# Patient Record
Sex: FEMALE | Race: WHITE | Employment: OTHER | URBAN - METROPOLITAN AREA
[De-identification: names, ages, dates, MRNs, and addresses within clinical notes are randomized per-mention and may not be internally consistent; named-entity substitution may affect disease eponyms.]

---

## 2022-11-29 ENCOUNTER — APPOINTMENT (OUTPATIENT)
Dept: GENERAL RADIOLOGY | Age: 87
End: 2022-11-29
Payer: MEDICARE

## 2022-11-29 ENCOUNTER — HOSPITAL ENCOUNTER (INPATIENT)
Age: 87
LOS: 2 days | Discharge: HOME | End: 2022-12-02
Attending: INTERNAL MEDICINE | Admitting: INTERNAL MEDICINE
Payer: MEDICARE

## 2022-11-29 DIAGNOSIS — J96.21 ACUTE ON CHRONIC RESPIRATORY FAILURE WITH HYPOXIA (HCC): ICD-10-CM

## 2022-11-29 DIAGNOSIS — J10.1 INFLUENZA A: Primary | ICD-10-CM

## 2022-11-29 LAB
BASOPHILS ABSOLUTE: 0 K/UL (ref 0–0.2)
BASOPHILS RELATIVE PERCENT: 0.4 %
EOSINOPHILS ABSOLUTE: 0.3 K/UL (ref 0–0.6)
EOSINOPHILS RELATIVE PERCENT: 3.2 %
HCT VFR BLD CALC: 38.2 % (ref 36–48)
HEMOGLOBIN: 12.6 G/DL (ref 12–16)
LYMPHOCYTES ABSOLUTE: 1.9 K/UL (ref 1–5.1)
LYMPHOCYTES RELATIVE PERCENT: 23.3 %
MCH RBC QN AUTO: 30 PG (ref 26–34)
MCHC RBC AUTO-ENTMCNC: 33.1 G/DL (ref 31–36)
MCV RBC AUTO: 90.6 FL (ref 80–100)
MONOCYTES ABSOLUTE: 0.7 K/UL (ref 0–1.3)
MONOCYTES RELATIVE PERCENT: 8.5 %
NEUTROPHILS ABSOLUTE: 5.2 K/UL (ref 1.7–7.7)
NEUTROPHILS RELATIVE PERCENT: 64.6 %
PDW BLD-RTO: 13.6 % (ref 12.4–15.4)
PLATELET # BLD: 274 K/UL (ref 135–450)
PMV BLD AUTO: 7.7 FL (ref 5–10.5)
RBC # BLD: 4.21 M/UL (ref 4–5.2)
WBC # BLD: 8.1 K/UL (ref 4–11)

## 2022-11-29 PROCEDURE — 83605 ASSAY OF LACTIC ACID: CPT

## 2022-11-29 PROCEDURE — 94640 AIRWAY INHALATION TREATMENT: CPT

## 2022-11-29 PROCEDURE — 85025 COMPLETE CBC W/AUTO DIFF WBC: CPT

## 2022-11-29 PROCEDURE — 6370000000 HC RX 637 (ALT 250 FOR IP): Performed by: NURSE PRACTITIONER

## 2022-11-29 PROCEDURE — 80053 COMPREHEN METABOLIC PANEL: CPT

## 2022-11-29 PROCEDURE — 6360000002 HC RX W HCPCS: Performed by: NURSE PRACTITIONER

## 2022-11-29 PROCEDURE — 71045 X-RAY EXAM CHEST 1 VIEW: CPT

## 2022-11-29 PROCEDURE — 87040 BLOOD CULTURE FOR BACTERIA: CPT

## 2022-11-29 PROCEDURE — 2700000000 HC OXYGEN THERAPY PER DAY

## 2022-11-29 PROCEDURE — 96374 THER/PROPH/DIAG INJ IV PUSH: CPT

## 2022-11-29 PROCEDURE — 83880 ASSAY OF NATRIURETIC PEPTIDE: CPT

## 2022-11-29 PROCEDURE — 51701 INSERT BLADDER CATHETER: CPT

## 2022-11-29 PROCEDURE — 84145 PROCALCITONIN (PCT): CPT

## 2022-11-29 PROCEDURE — 87635 SARS-COV-2 COVID-19 AMP PRB: CPT

## 2022-11-29 PROCEDURE — 99285 EMERGENCY DEPT VISIT HI MDM: CPT

## 2022-11-29 PROCEDURE — 36415 COLL VENOUS BLD VENIPUNCTURE: CPT

## 2022-11-29 PROCEDURE — 84484 ASSAY OF TROPONIN QUANT: CPT

## 2022-11-29 RX ORDER — IPRATROPIUM BROMIDE AND ALBUTEROL SULFATE 2.5; .5 MG/3ML; MG/3ML
1 SOLUTION RESPIRATORY (INHALATION) ONCE
Status: COMPLETED | OUTPATIENT
Start: 2022-11-29 | End: 2022-11-29

## 2022-11-29 RX ORDER — ALBUTEROL SULFATE 2.5 MG/3ML
5 SOLUTION RESPIRATORY (INHALATION) ONCE
Status: COMPLETED | OUTPATIENT
Start: 2022-11-29 | End: 2022-11-29

## 2022-11-29 RX ORDER — METHYLPREDNISOLONE SODIUM SUCCINATE 125 MG/2ML
125 INJECTION, POWDER, LYOPHILIZED, FOR SOLUTION INTRAMUSCULAR; INTRAVENOUS ONCE
Status: COMPLETED | OUTPATIENT
Start: 2022-11-29 | End: 2022-11-29

## 2022-11-29 RX ADMIN — IPRATROPIUM BROMIDE AND ALBUTEROL SULFATE 1 AMPULE: .5; 3 SOLUTION RESPIRATORY (INHALATION) at 23:28

## 2022-11-29 RX ADMIN — ALBUTEROL SULFATE 5 MG: 2.5 SOLUTION RESPIRATORY (INHALATION) at 23:28

## 2022-11-29 RX ADMIN — METHYLPREDNISOLONE SODIUM SUCCINATE 125 MG: 125 INJECTION, POWDER, FOR SOLUTION INTRAMUSCULAR; INTRAVENOUS at 23:42

## 2022-11-29 ASSESSMENT — ENCOUNTER SYMPTOMS
ABDOMINAL PAIN: 0
DIARRHEA: 0
VOMITING: 0
CHEST TIGHTNESS: 1
NAUSEA: 0
SHORTNESS OF BREATH: 1
COUGH: 1

## 2022-11-29 ASSESSMENT — PAIN - FUNCTIONAL ASSESSMENT: PAIN_FUNCTIONAL_ASSESSMENT: NONE - DENIES PAIN

## 2022-11-29 ASSESSMENT — LIFESTYLE VARIABLES
HOW OFTEN DO YOU HAVE A DRINK CONTAINING ALCOHOL: NEVER
HOW MANY STANDARD DRINKS CONTAINING ALCOHOL DO YOU HAVE ON A TYPICAL DAY: PATIENT DOES NOT DRINK

## 2022-11-30 PROBLEM — J96.01 ACUTE RESPIRATORY FAILURE WITH HYPOXIA (HCC): Status: ACTIVE | Noted: 2022-11-30

## 2022-11-30 LAB
A/G RATIO: 1.1 (ref 1.1–2.2)
ALBUMIN SERPL-MCNC: 3.8 G/DL (ref 3.4–5)
ALP BLD-CCNC: 120 U/L (ref 40–129)
ALT SERPL-CCNC: 16 U/L (ref 10–40)
ANION GAP SERPL CALCULATED.3IONS-SCNC: 2 MMOL/L (ref 3–16)
AST SERPL-CCNC: 16 U/L (ref 15–37)
BASE EXCESS VENOUS: 10.8 MMOL/L (ref -3–3)
BILIRUB SERPL-MCNC: 0.3 MG/DL (ref 0–1)
BUN BLDV-MCNC: 14 MG/DL (ref 7–20)
CALCIUM SERPL-MCNC: 9.8 MG/DL (ref 8.3–10.6)
CARBOXYHEMOGLOBIN: 2.7 % (ref 0–1.5)
CHLORIDE BLD-SCNC: 92 MMOL/L (ref 99–110)
CO2: 41 MMOL/L (ref 21–32)
CREAT SERPL-MCNC: 0.6 MG/DL (ref 0.6–1.2)
EKG ATRIAL RATE: 78 BPM
EKG DIAGNOSIS: NORMAL
EKG P AXIS: 95 DEGREES
EKG P-R INTERVAL: 160 MS
EKG Q-T INTERVAL: 440 MS
EKG QRS DURATION: 148 MS
EKG QTC CALCULATION (BAZETT): 501 MS
EKG R AXIS: -50 DEGREES
EKG T AXIS: 80 DEGREES
EKG VENTRICULAR RATE: 78 BPM
GFR SERPL CREATININE-BSD FRML MDRD: >60 ML/MIN/{1.73_M2}
GLUCOSE BLD-MCNC: 156 MG/DL (ref 70–99)
HCO3 VENOUS: 40 MMOL/L (ref 23–29)
LACTIC ACID, SEPSIS: 1.4 MMOL/L (ref 0.4–1.9)
MAGNESIUM: 1.6 MG/DL (ref 1.8–2.4)
METHEMOGLOBIN VENOUS: 0.4 %
O2 CONTENT, VEN: 18 VOL %
O2 SAT, VEN: 99 %
O2 THERAPY: ABNORMAL
PCO2, VEN: 76.5 MMHG (ref 40–50)
PH VENOUS: 7.33 (ref 7.35–7.45)
PO2, VEN: 131 MMHG (ref 25–40)
POTASSIUM SERPL-SCNC: 4.6 MMOL/L (ref 3.5–5.1)
PRO-BNP: 341 PG/ML (ref 0–449)
PROCALCITONIN: 0.04 NG/ML (ref 0–0.15)
RAPID INFLUENZA  B AGN: NEGATIVE
RAPID INFLUENZA A AGN: NEGATIVE
SARS-COV-2, NAAT: NOT DETECTED
SODIUM BLD-SCNC: 135 MMOL/L (ref 136–145)
TCO2 CALC VENOUS: 95 MMOL/L
TOTAL PROTEIN: 7.2 G/DL (ref 6.4–8.2)
TROPONIN: <0.01 NG/ML

## 2022-11-30 PROCEDURE — 2700000000 HC OXYGEN THERAPY PER DAY

## 2022-11-30 PROCEDURE — 82803 BLOOD GASES ANY COMBINATION: CPT

## 2022-11-30 PROCEDURE — 2580000003 HC RX 258: Performed by: INTERNAL MEDICINE

## 2022-11-30 PROCEDURE — 6370000000 HC RX 637 (ALT 250 FOR IP): Performed by: INTERNAL MEDICINE

## 2022-11-30 PROCEDURE — 94640 AIRWAY INHALATION TREATMENT: CPT

## 2022-11-30 PROCEDURE — 1200000000 HC SEMI PRIVATE

## 2022-11-30 PROCEDURE — 2500000003 HC RX 250 WO HCPCS: Performed by: INTERNAL MEDICINE

## 2022-11-30 PROCEDURE — 6360000002 HC RX W HCPCS: Performed by: INTERNAL MEDICINE

## 2022-11-30 PROCEDURE — 87804 INFLUENZA ASSAY W/OPTIC: CPT

## 2022-11-30 PROCEDURE — 2060000000 HC ICU INTERMEDIATE R&B

## 2022-11-30 PROCEDURE — 93005 ELECTROCARDIOGRAM TRACING: CPT | Performed by: INTERNAL MEDICINE

## 2022-11-30 PROCEDURE — 94150 VITAL CAPACITY TEST: CPT

## 2022-11-30 PROCEDURE — 83735 ASSAY OF MAGNESIUM: CPT

## 2022-11-30 PROCEDURE — 93010 ELECTROCARDIOGRAM REPORT: CPT | Performed by: INTERNAL MEDICINE

## 2022-11-30 PROCEDURE — 94761 N-INVAS EAR/PLS OXIMETRY MLT: CPT

## 2022-11-30 PROCEDURE — 36415 COLL VENOUS BLD VENIPUNCTURE: CPT

## 2022-11-30 RX ORDER — IPRATROPIUM BROMIDE AND ALBUTEROL SULFATE 2.5; .5 MG/3ML; MG/3ML
1 SOLUTION RESPIRATORY (INHALATION) 2 TIMES DAILY
Status: DISCONTINUED | OUTPATIENT
Start: 2022-11-30 | End: 2022-12-02 | Stop reason: HOSPADM

## 2022-11-30 RX ORDER — ASPIRIN 81 MG/1
81 TABLET ORAL DAILY
COMMUNITY

## 2022-11-30 RX ORDER — ONDANSETRON 2 MG/ML
4 INJECTION INTRAMUSCULAR; INTRAVENOUS EVERY 6 HOURS PRN
Status: DISCONTINUED | OUTPATIENT
Start: 2022-11-30 | End: 2022-12-02 | Stop reason: HOSPADM

## 2022-11-30 RX ORDER — MAGNESIUM SULFATE IN WATER 40 MG/ML
2000 INJECTION, SOLUTION INTRAVENOUS PRN
Status: DISCONTINUED | OUTPATIENT
Start: 2022-11-30 | End: 2022-12-02 | Stop reason: HOSPADM

## 2022-11-30 RX ORDER — FUROSEMIDE 10 MG/ML
40 INJECTION INTRAMUSCULAR; INTRAVENOUS ONCE
Status: COMPLETED | OUTPATIENT
Start: 2022-11-30 | End: 2022-11-30

## 2022-11-30 RX ORDER — ENOXAPARIN SODIUM 100 MG/ML
40 INJECTION SUBCUTANEOUS DAILY
Status: DISCONTINUED | OUTPATIENT
Start: 2022-11-30 | End: 2022-12-02 | Stop reason: HOSPADM

## 2022-11-30 RX ORDER — SODIUM CHLORIDE 0.9 % (FLUSH) 0.9 %
10 SYRINGE (ML) INJECTION EVERY 12 HOURS SCHEDULED
Status: DISCONTINUED | OUTPATIENT
Start: 2022-11-30 | End: 2022-12-02 | Stop reason: HOSPADM

## 2022-11-30 RX ORDER — LORAZEPAM 0.5 MG/1
0.5 TABLET ORAL ONCE
Status: COMPLETED | OUTPATIENT
Start: 2022-11-30 | End: 2022-11-30

## 2022-11-30 RX ORDER — ALBUTEROL SULFATE 2.5 MG/3ML
2.5 SOLUTION RESPIRATORY (INHALATION) EVERY 4 HOURS PRN
Status: DISCONTINUED | OUTPATIENT
Start: 2022-11-30 | End: 2022-12-02 | Stop reason: HOSPADM

## 2022-11-30 RX ORDER — BENAZEPRIL HYDROCHLORIDE 20 MG/1
20 TABLET ORAL 2 TIMES DAILY
COMMUNITY

## 2022-11-30 RX ORDER — AMLODIPINE BESYLATE 10 MG/1
10 TABLET ORAL DAILY
COMMUNITY

## 2022-11-30 RX ORDER — LOVASTATIN 20 MG/1
20 TABLET ORAL NIGHTLY
COMMUNITY

## 2022-11-30 RX ORDER — PROMETHAZINE HYDROCHLORIDE 25 MG/1
12.5 TABLET ORAL EVERY 6 HOURS PRN
Status: DISCONTINUED | OUTPATIENT
Start: 2022-11-30 | End: 2022-12-02 | Stop reason: HOSPADM

## 2022-11-30 RX ORDER — VITAMIN E 268 MG
400 CAPSULE ORAL DAILY
COMMUNITY

## 2022-11-30 RX ORDER — CHLORAL HYDRATE 500 MG
1000 CAPSULE ORAL DAILY
COMMUNITY

## 2022-11-30 RX ORDER — IPRATROPIUM BROMIDE AND ALBUTEROL SULFATE 2.5; .5 MG/3ML; MG/3ML
1 SOLUTION RESPIRATORY (INHALATION) 4 TIMES DAILY
Status: DISCONTINUED | OUTPATIENT
Start: 2022-11-30 | End: 2022-11-30

## 2022-11-30 RX ORDER — SODIUM CHLORIDE 0.9 % (FLUSH) 0.9 %
10 SYRINGE (ML) INJECTION PRN
Status: DISCONTINUED | OUTPATIENT
Start: 2022-11-30 | End: 2022-12-02 | Stop reason: HOSPADM

## 2022-11-30 RX ORDER — ALBUTEROL SULFATE 90 UG/1
2 AEROSOL, METERED RESPIRATORY (INHALATION) EVERY 6 HOURS PRN
COMMUNITY

## 2022-11-30 RX ORDER — PREDNISONE 20 MG/1
40 TABLET ORAL DAILY
Status: DISCONTINUED | OUTPATIENT
Start: 2022-11-30 | End: 2022-12-02 | Stop reason: HOSPADM

## 2022-11-30 RX ORDER — ACETAMINOPHEN 325 MG/1
650 TABLET ORAL EVERY 6 HOURS PRN
Status: DISCONTINUED | OUTPATIENT
Start: 2022-11-30 | End: 2022-12-02 | Stop reason: HOSPADM

## 2022-11-30 RX ORDER — SODIUM CHLORIDE 9 MG/ML
INJECTION, SOLUTION INTRAVENOUS PRN
Status: DISCONTINUED | OUTPATIENT
Start: 2022-11-30 | End: 2022-12-02 | Stop reason: HOSPADM

## 2022-11-30 RX ORDER — ACETAMINOPHEN 650 MG/1
650 SUPPOSITORY RECTAL EVERY 6 HOURS PRN
Status: DISCONTINUED | OUTPATIENT
Start: 2022-11-30 | End: 2022-12-02 | Stop reason: HOSPADM

## 2022-11-30 RX ORDER — POTASSIUM CHLORIDE 7.45 MG/ML
10 INJECTION INTRAVENOUS PRN
Status: DISCONTINUED | OUTPATIENT
Start: 2022-11-30 | End: 2022-12-02 | Stop reason: HOSPADM

## 2022-11-30 RX ORDER — FUROSEMIDE 10 MG/ML
40 INJECTION INTRAMUSCULAR; INTRAVENOUS 2 TIMES DAILY
Status: DISCONTINUED | OUTPATIENT
Start: 2022-11-30 | End: 2022-12-02 | Stop reason: HOSPADM

## 2022-11-30 RX ORDER — METOPROLOL SUCCINATE 100 MG/1
100 TABLET, EXTENDED RELEASE ORAL DAILY
COMMUNITY

## 2022-11-30 RX ORDER — ISOSORBIDE MONONITRATE 120 MG/1
120 TABLET, EXTENDED RELEASE ORAL DAILY
COMMUNITY

## 2022-11-30 RX ADMIN — FUROSEMIDE 40 MG: 10 INJECTION, SOLUTION INTRAMUSCULAR; INTRAVENOUS at 20:02

## 2022-11-30 RX ADMIN — DOXYCYCLINE 100 MG: 100 INJECTION, POWDER, LYOPHILIZED, FOR SOLUTION INTRAVENOUS at 16:40

## 2022-11-30 RX ADMIN — LORAZEPAM 0.5 MG: 0.5 TABLET ORAL at 15:43

## 2022-11-30 RX ADMIN — IPRATROPIUM BROMIDE AND ALBUTEROL SULFATE 1 AMPULE: .5; 3 SOLUTION RESPIRATORY (INHALATION) at 07:20

## 2022-11-30 RX ADMIN — ENOXAPARIN SODIUM 40 MG: 100 INJECTION SUBCUTANEOUS at 09:25

## 2022-11-30 RX ADMIN — FUROSEMIDE 40 MG: 10 INJECTION, SOLUTION INTRAMUSCULAR; INTRAVENOUS at 03:53

## 2022-11-30 RX ADMIN — MAGNESIUM SULFATE HEPTAHYDRATE 2000 MG: 40 INJECTION, SOLUTION INTRAVENOUS at 13:23

## 2022-11-30 RX ADMIN — PREDNISONE 40 MG: 20 TABLET ORAL at 09:25

## 2022-11-30 RX ADMIN — Medication 10 ML: at 20:02

## 2022-11-30 RX ADMIN — IPRATROPIUM BROMIDE AND ALBUTEROL SULFATE 1 AMPULE: .5; 3 SOLUTION RESPIRATORY (INHALATION) at 20:46

## 2022-11-30 RX ADMIN — DOXYCYCLINE 100 MG: 100 INJECTION, POWDER, LYOPHILIZED, FOR SOLUTION INTRAVENOUS at 05:03

## 2022-11-30 RX ADMIN — Medication 10 ML: at 09:26

## 2022-11-30 ASSESSMENT — PAIN SCALES - GENERAL
PAINLEVEL_OUTOF10: 0
PAINLEVEL_OUTOF10: 0

## 2022-11-30 NOTE — ED PROVIDER NOTES
EKG Interpretation    Interpreted by emergency department physician    Rhythm: normal sinus   Rate: normal  Axis: left  Ectopy: none  Conduction: lbbb  ST Segments: no acute change  T Waves: no acute change  Q Waves: none    Clinical Impression: Normal sinus rhythm    MD Gianna Landers MD  11/30/22 5622

## 2022-11-30 NOTE — ED PROVIDER NOTES
[facility]        Pt Name: Arleen Heart  MRN: [id number]  [place] 1934  Date of evaluation: 11/29/2022  Provider: CHANNING Sanchez CNP  PCP: No primary care provider on file. Note Started: 11:09 PM EST 11/29/22          REJI. I have evaluated this patient. My supervising physician was available for consultation. CHIEF COMPLAINT       Chief Complaint   Patient presents with    Shortness of Breath     Pt arrived via [address] [place] [address] EMS from home w c/o SOB. Pt was Dx pneumonia on Sunday in urgent care but \"felt like cant handle it\". 3 lpm O2 via [place] baseline. Got Duoneb en route       HISTORY OF PRESENT ILLNESS   (Location, Timing/Onset, Context/Setting, Quality, Duration, Modifying Factors, Severity, Associated Signs and Symptoms)  Note limiting factors. Chief Complaint: rosanna Heart is a 80 y.o. female who presents to the emergency department with complaint of shortness of breath. The patient was diagnosed with influenza last week and was seen at urgent care Sunday, told that she had pneumonia and given an antibiotic. States that she is taken 3 days of the antibiotic but the shortness of breath has worsened. The patient does wear 3 L of oxygen at all times but it can increase her oxygen to 4 L with activity. Patient was given 1 DuoNeb in route. She complains of shortness of breath with wheezing    Denies any headache, fever, lightheadedness, dizziness, visual disturbances. No chest pain or pressure. No neck or back pain. No abdominal pain, nausea, vomiting, diarrhea, constipation, or dysuria. No rash. Nursing Notes were all reviewed and agreed with or any disagreements were addressed in the HPI. REVIEW OF SYSTEMS    (2-9 systems for level 4, 10 or more for level 5)     Review of Systems   Constitutional:  Positive for fatigue. Negative for activity change, chills and fever.    Respiratory:  Positive for cough, chest tightness and shortness of breath. Cardiovascular:  Negative for chest pain. Gastrointestinal:  Negative for abdominal pain, diarrhea, nausea and vomiting. Genitourinary:  Negative for dysuria. All other systems reviewed and are negative. Positives and Pertinent negatives as per HPI. Except as noted above in the ROS, all other systems were reviewed and negative. PAST MEDICAL HISTORY   History reviewed. No pertinent past medical history. SURGICAL HISTORY   History reviewed. No pertinent surgical history. CURRENTMEDICATIONS       Previous Medications    No medications on file         ALLERGIES     Patient has no known allergies. FAMILYHISTORY     History reviewed. No pertinent family history. SOCIAL HISTORY       Social History     Tobacco Use    Smoking status: Never    Smokeless tobacco: Never   Substance Use Topics    Alcohol use: Never    Drug use: Never       SCREENINGS    Monroe Coma Scale  Eye Opening: Spontaneous  Best Verbal Response: Oriented  Best Motor Response: Obeys commands  Denny Coma Scale Score: 15        PHYSICAL EXAM    (up to 7 for level 4, 8 or more for level 5)     ED Triage Vitals [11/29/22 2217]   BP Temp Temp Source Heart Rate Resp SpO2 Height Weight   (!) 160/79 97.9 °F (36.6 °C) Oral 70 24 92 % -- --       Physical Exam  Vitals and nursing note reviewed. Constitutional:       Appearance: She is well-developed. She is not diaphoretic. HENT:      Head: Normocephalic and atraumatic. Right Ear: External ear normal.      Left Ear: External ear normal.   Eyes:      General:         Right eye: No discharge. Left eye: No discharge. Neck:      Vascular: No JVD. Cardiovascular:      Rate and Rhythm: Normal rate. Pulses: Normal pulses. Pulmonary:      Effort: Pulmonary effort is normal. No respiratory distress. Breath sounds: Wheezing present.       Comments: Increased work of breathing at rest  Abdominal:      Palpations: Abdomen is soft. Tenderness: There is no abdominal tenderness. Musculoskeletal:         General: Normal range of motion. Skin:     General: Skin is warm and dry. Coloration: Skin is not pale. Neurological:      Mental Status: She is alert. Psychiatric:         Behavior: Behavior normal.       DIAGNOSTIC RESULTS   LABS:    Labs Reviewed   COMPREHENSIVE METABOLIC PANEL - Abnormal; Notable for the following components:       Result Value    Sodium 135 (*)     Chloride 92 (*)     CO2 41 (*)     Anion Gap 2 (*)     Glucose 156 (*)     All other components within normal limits   COVID-19, RAPID   CULTURE, BLOOD 1   CULTURE, BLOOD 2   CBC WITH AUTO DIFFERENTIAL   PROCALCITONIN   LACTATE, SEPSIS   LACTATE, SEPSIS       When ordered only abnormal lab results are displayed. All other labs were within normal range or not returned as of this dictation. EKG: When ordered, EKG's are interpreted by the Emergency Department Physician in the absence of a cardiologist.  Please see their note for interpretation of EKG. RADIOLOGY:   Non-plain film images such as CT, Ultrasound and MRI are read by the radiologist. Plain radiographic images are visualized and preliminarily interpreted by the ED Provider with the below findings:        Interpretation per the Radiologist below, if available at the time of this note:    XR CHEST PORTABLE   Final Result   Mild cardiomegaly and mild central pulmonary congestion or pulmonary artery   hypertension. Hazy perihilar bibasilar interstitial edema vs pneumonitis or residual   fibrosis and scarring           No results found. PROCEDURES   Unless otherwise noted below, none     Procedures    CRITICAL CARE TIME   There was a high probability of life-threatening clinical deterioration in the patient's condition requiring my urgent intervention.   I personally saw the patient and independently provided 31 minutes of non-concurrent critical care out of the total shared critical vs pneumonitis or residual   fibrosis and scarring               Labs are unremarkable. Negative lactate and unremarkable procalcitonin. Patient diagnosed with influenza 1 week ago and did complete a 5-day course of Tamiflu. She did revisit urgent care Sunday and was placed on an antibiotic, she is uncertain of the name of this antibiotic, states that she does have several days left, has only taken 5 doses. Reports that she continues to decline and is short of breath at rest.    Will admit this patient for acute on chronic respiratory failure. Patient placed on high flow oxygen in the emergency department. Patient agreeable regarding plan of care. Hospitalist to admit this patient to    FINAL IMPRESSION      1. Influenza A    2. Acute on chronic respiratory failure with hypoxia ([facility] [address]          DISPOSITION/PLAN   DISPOSITION Decision To Admit 11/30/2022 02:12:25 AM      PATIENT REFERRED TO:  No follow-up provider specified.     DISCHARGE MEDICATIONS:  New Prescriptions    No medications on file       DISCONTINUED MEDICATIONS:  Discontinued Medications    No medications on file              (Please note that portions of this note were completed with a voice recognition program.  Efforts were made to edit the dictations but occasionally words are mis-transcribed.)    CHANNING Guido CNP (electronically signed)            CHANNING Guido CNP  11/30/22 7855

## 2022-11-30 NOTE — RT PROTOCOL NOTE
RT Inhaler-Nebulizer Bronchodilator Protocol Note    There is a bronchodilator order in the chart from a provider indicating to follow the RT Bronchodilator Protocol and there is an Initiate RT Inhaler-Nebulizer Bronchodilator Protocol order as well (see protocol at bottom of note). CXR Findings:  XR CHEST PORTABLE    Result Date: 11/30/2022  Mild cardiomegaly and mild central pulmonary congestion or pulmonary artery hypertension. Hazy perihilar bibasilar interstitial edema vs pneumonitis or residual fibrosis and scarring       The findings from the last RT Protocol Assessment were as follows:   History Pulmonary Disease: None or smoker <15 pack years  Respiratory Pattern: Dyspnea on exertion or RR 21-25 bpm  Breath Sounds: Slightly diminished and/or crackles  Cough: Strong, spontaneous, non-productive  Indication for Bronchodilator Therapy: Wheezing associated with pulm disorder, On home bronchodilators  Bronchodilator Assessment Score: 4    Aerosolized bronchodilator medication orders have been revised according to the RT Inhaler-Nebulizer Bronchodilator Protocol below. Respiratory Therapist to perform RT Therapy Protocol Assessment initially then follow the protocol. Repeat RT Therapy Protocol Assessment PRN for score 0-3 or on second treatment, BID, and PRN for scores above 3. No Indications - adjust the frequency to every 6 hours PRN wheezing or bronchospasm, if no treatments needed after 48 hours then discontinue using Per Protocol order mode. If indication present, adjust the RT bronchodilator orders based on the Bronchodilator Assessment Score as indicated below.   Use Inhaler orders unless patient has one or more of the following: on home nebulizer, not able to hold breath for 10 seconds, is not alert and oriented, cannot activate and use MDI correctly, or respiratory rate 25 breaths per minute or more, then use the equivalent nebulizer order(s) with same Frequency and PRN reasons based on the score. If a patient is on this medication at home then do not decrease Frequency below that used at home. 0-3 - enter or revise RT bronchodilator order(s) to equivalent RT Bronchodilator order with Frequency of every 4 hours PRN for wheezing or increased work of breathing using Per Protocol order mode. 4-6 - enter or revise RT Bronchodilator order(s) to two equivalent RT bronchodilator orders with one order with BID Frequency and one order with Frequency of every 4 hours PRN wheezing or increased work of breathing using Per Protocol order mode. 7-10 - enter or revise RT Bronchodilator order(s) to two equivalent RT bronchodilator orders with one order with TID Frequency and one order with Frequency of every 4 hours PRN wheezing or increased work of breathing using Per Protocol order mode. 11-13 - enter or revise RT Bronchodilator order(s) to one equivalent RT bronchodilator order with QID Frequency and an Albuterol order with Frequency of every 4 hours PRN wheezing or increased work of breathing using Per Protocol order mode. Greater than 13 - enter or revise RT Bronchodilator order(s) to one equivalent RT bronchodilator order with every 4 hours Frequency and an Albuterol order with Frequency of every 2 hours PRN wheezing or increased work of breathing using Per Protocol order mode. RT to enter RT Home Evaluation for COPD & MDI Assessment order using Per Protocol order mode.     Electronically signed by Clifford Wiggins RCP on 11/30/2022 at 4:38 AM

## 2022-11-30 NOTE — PROGRESS NOTES
11/30/22 0437   RT Protocol   History Pulmonary Disease 0   Respiratory pattern 2   Breath sounds 2   Cough 0   Indications for Bronchodilator Therapy Wheezing associated with pulm disorder; On home bronchodilators   Bronchodilator Assessment Score 4

## 2022-11-30 NOTE — PROGRESS NOTES
Incentive Spirometry education and demonstration completed by Respiratory Therapy Yes      Response to education: Poor     Teaching Time: 10 minutes    Minimum Predicted Vital Capacity - 500 mL. Patient's Actual Vital Capacity - 0 mL.  Turning over to Nursing for routine follow-up No.      Electronically signed by Callie Alves RCP on 11/30/2022 at 7:29 AM

## 2022-11-30 NOTE — PROGRESS NOTES
Pharmacy Home Medication Reconciliation Note    A medication reconciliation has been completed for James Yao 1934    Pharmacy: [id number] [place] [address])  Information provided by: patient, fills confirmed by Shahzad Chatman [address]    The patient's home medication list is as follows:  Prior to Admission medications    Medication Sig Start Date End Date Taking? Authorizing Provider   aspirin 81 MG EC tablet Take 81 mg by mouth daily   Yes Historical Provider, MD   MAGNESIUM PO Take 1 tablet by mouth daily   Yes Historical Provider, MD   Cholecalciferol (VITAMIN D) 10 MCG (400 UNIT) CAPS Capsule Take 400 Units by mouth daily   Yes Historical Provider, MD   vitamin E 400 UNIT capsule Take 400 Units by mouth daily   Yes Historical Provider, MD   Omega-3 Fatty Acids (FISH OIL) 1000 MG capsule Take 1,000 mg by mouth daily   Yes Historical Provider, MD   metFORMIN (GLUCOPHAGE) 1000 MG tablet Take 1,000 mg by mouth 2 times daily (with meals)   Yes Historical Provider, MD   metoprolol succinate (TOPROL XL) 100 MG extended release tablet Take 100 mg by mouth daily   Yes Historical Provider, MD   benazepril (LOTENSIN) 20 MG tablet Take 20 mg by mouth in the morning and at bedtime   Yes Historical Provider, MD   amLODIPine (NORVASC) 10 MG tablet Take 10 mg by mouth daily   Yes Historical Provider, MD   isosorbide mononitrate (IMDUR) 120 MG extended release tablet Take 120 mg by mouth daily   Yes Historical Provider, MD   lovastatin (MEVACOR) 20 MG tablet Take 20 mg by mouth nightly   Yes Historical Provider, MD   albuterol sulfate HFA (PROVENTIL;VENTOLIN;PROAIR) 108 (90 Base) MCG/ACT inhaler Inhale 2 puffs into the lungs every 6 hours as needed for Wheezing   Yes Historical Provider, MD       Timing of last doses updated.     Thank you,  Tor Diane, PharmD, BCCCP

## 2022-11-30 NOTE — H&P
[address] History & Physical      PCP: No primary care provider on file. Date of Admission: 11/29/2022    Date of Service: Pt seen/examined on 11/30/2022    Pt seen/examined face to face on and admitted as inpatient with expected LOS greater than two midnights due to medical therapy  . Chief Complaint:    Chief Complaint   Patient presents with    Shortness of Breath     Pt arrived via [address] [place] [address] EMS from home w c/o SOB. Pt was Dx pneumonia on Sunday in urgent care but \"felt like cant handle it\". 3 lpm O2 via [place] baseline. Got Duoneb en route        History Of Present Illness:      80 y.o. female who presented to [facility] with past medical history of pulmonary disease unspecified, chronic hypoxic respiratory failure on 2 L baseline, cardiomyopathy unknown to patient hypertension presented ED with chief complaint of shortness of breath. This  Patient reported that she lives at [facility] with her pulmonologist denied having COPD due to never being a smoker reports that he does have some underlying lung disease and cardiac disease unknown. Patient reports that she heard 1 time somebody told her that she has heart failure. Patient reports that she has been eating salt, increase fluid intake, does report orthopnea and lower extremity edema however does report that the lower extremity might have sometimes improved after asleep. Patient also reports cough shortness of breath on exertion no associated fever chills nausea vomiting abdominal pain or dysuria. Patient tried nebulizers on route but did not adequately improve,. Patient was urgent care back on Sunday and dx with influenza reported they did receive antibiotic does not member what type of antibiotic but continued to worsen. CODE STATUS discussed and the patient is a full code, patient would be willing to be intubated in the setting that this is a life saving measures.   Patient was amendable for us to request medical records    Past Medical History:    Hypertension, lung disease, cardiac disease unknown  History reviewed. No pertinent past medical history. Past Surgical History:    Surgical History reviewed with patient and negative for recent surgies. History reviewed. No pertinent surgical history. Medications Prior to Admission:      Prior to Admission medications    Not on File       Allergies:  Patient has no known allergies. Social History:          TOBACCO:   reports that she has never smoked. She has never used smokeless tobacco.  ETOH:   reports no history of alcohol use. E-cigarette/Vaping       Questions Responses    E-cigarette/Vaping Use     Start Date     Passive Exposure     Quit Date     Counseling Given     Comments               Family History:      Family History reviewed with patient, and does not pertain and non-contributory to the current illness    History reviewed. No pertinent family history. REVIEW OF SYSTEMS:     Constitutional:  No Fever, No Chills, No Night Sweats  ENT/Mouth:  No Nasal Congestion,  No Hoarseness, No new mouth lesion  Eyes:  No Eye Pain, No Redness, No Discharge  Cardiovascular:  No Chest Pain, No Orthopnea, No Palpitations  Respiratory: + Cough, + sputum, + dyspnea  Gastrointestinal: No Vomiting, No Diarrhea, No abdominal pain  Genitourinary: No Urinary Frequency, No Hematuria, No Urinary pain  Musculoskeletal:  No worsening Arthralgias, No worsening Myalgias  Skin:  No new Skin Lesions, No new skin rash  Neuro:  No new weakness, No new numbness. Psych:  No suicial ideation, No Violence ideation    PHYSICAL EXAM PERFORMED:    BP (!) 129/52   Pulse 62   Temp 97.9 °F (36.6 °C) (Oral)   Resp 19   SpO2 92%     General appearance:  mild acute distress, appears older than stated age  HEENT:   atraumatic, sclera anicteric, Conjunctivae clear. Neck: Supple,Trachea midline, no goiter  Respiratory:minimal accessory muscle usage, Normal respiratory effort.   Rhales bilaterally with wheezing  Cardiovascular:  Regular rate and rhythm, capillary refill 2 seconds  Abdomen: Soft, non-tender, non-distended with normal bowel sounds. Musculoskeletal:  No clubbing, cyanosis. 1+ edema LE bilaterally. Skin: turgor normal.  No new rashes or lesions. Neurologic: Alert and oriented x4, no new focal sensory/motor deficits. Labs:     Recent Labs     11/29/22  2333   WBC 8.1   HGB 12.6   HCT 38.2        Recent Labs     11/29/22  2333   *   K 4.6   CL 92*   CO2 41*   BUN 14   CREATININE 0.6   CALCIUM 9.8     Recent Labs     11/29/22  2333   AST 16   ALT 16   BILITOT 0.3   ALKPHOS 120     No results for input(s): INR in the last 72 hours. No results for input(s): [place] Bounds in the last 72 hours. Urinalysis:    No results found for: Valerie Eth, [address], [facility] Professor Nils Cristobal [facility] [address], [address] [place] [address] [address], [facility] [address]94    Radiology:     CXR: I have reviewed the CXR with the following interpretation:   Mild cardiomegaly with central pulmonary vascular congestion  EKG:  I have reviewed the EKG with the following interpretation:   Pending  XR CHEST PORTABLE   Final Result   Mild cardiomegaly and mild central pulmonary congestion or pulmonary artery   hypertension.       Hazy perihilar bibasilar interstitial edema vs pneumonitis or residual   fibrosis and scarring             ASSESSMENT AND PLAN:    Acute on chronic hypoxic respiratory failure:  Patient has known lung disease but does not know exactly what time he had  Wheezing on exam suspected to be obstructive disease  VBG pending to check for hypercapnia  Patient also was influenza positive  Wean oxygen as tolerated back to baseline at 3 L    Acute obstructive disease exacerbation:  Suspected emphysema  Doxycycline, DuoNebs, prednisone    Acute decompensated HF: Unknown EF to determine  Strict I's and O's, Daily weights  Lasix 40 IV twice daily  Continued home medications  Heart failure protocol set ordered  Continue to check response   Echo pending    Influenza positive: Reported tested outpatient  Supportive care    Diet: Cardiac diet    DVT Prophylaxis: lovenox    Dispo:   Expected LOS greater than two [place], DO

## 2022-12-01 LAB
ANION GAP SERPL CALCULATED.3IONS-SCNC: 6 MMOL/L (ref 3–16)
BUN BLDV-MCNC: 16 MG/DL (ref 7–20)
CALCIUM SERPL-MCNC: 9.4 MG/DL (ref 8.3–10.6)
CHLORIDE BLD-SCNC: 89 MMOL/L (ref 99–110)
CO2: 40 MMOL/L (ref 21–32)
CREAT SERPL-MCNC: <0.5 MG/DL (ref 0.6–1.2)
GFR SERPL CREATININE-BSD FRML MDRD: >60 ML/MIN/{1.73_M2}
GLUCOSE BLD-MCNC: 191 MG/DL (ref 70–99)
GLUCOSE BLD-MCNC: 254 MG/DL (ref 70–99)
GLUCOSE BLD-MCNC: 367 MG/DL (ref 70–99)
GLUCOSE BLD-MCNC: 435 MG/DL (ref 70–99)
HCT VFR BLD CALC: 38.5 % (ref 36–48)
HEMOGLOBIN: 13 G/DL (ref 12–16)
MAGNESIUM: 2 MG/DL (ref 1.8–2.4)
MCH RBC QN AUTO: 30.4 PG (ref 26–34)
MCHC RBC AUTO-ENTMCNC: 33.7 G/DL (ref 31–36)
MCV RBC AUTO: 90.2 FL (ref 80–100)
PDW BLD-RTO: 13.7 % (ref 12.4–15.4)
PERFORMED ON: ABNORMAL
PLATELET # BLD: 329 K/UL (ref 135–450)
PMV BLD AUTO: 7.7 FL (ref 5–10.5)
POTASSIUM SERPL-SCNC: 4.3 MMOL/L (ref 3.5–5.1)
RBC # BLD: 4.26 M/UL (ref 4–5.2)
SODIUM BLD-SCNC: 135 MMOL/L (ref 136–145)
WBC # BLD: 10.6 K/UL (ref 4–11)

## 2022-12-01 PROCEDURE — 6370000000 HC RX 637 (ALT 250 FOR IP): Performed by: INTERNAL MEDICINE

## 2022-12-01 PROCEDURE — 6360000002 HC RX W HCPCS: Performed by: INTERNAL MEDICINE

## 2022-12-01 PROCEDURE — 94640 AIRWAY INHALATION TREATMENT: CPT

## 2022-12-01 PROCEDURE — 83036 HEMOGLOBIN GLYCOSYLATED A1C: CPT

## 2022-12-01 PROCEDURE — 2580000003 HC RX 258: Performed by: INTERNAL MEDICINE

## 2022-12-01 PROCEDURE — 36415 COLL VENOUS BLD VENIPUNCTURE: CPT

## 2022-12-01 PROCEDURE — 93306 TTE W/DOPPLER COMPLETE: CPT

## 2022-12-01 PROCEDURE — 80048 BASIC METABOLIC PNL TOTAL CA: CPT

## 2022-12-01 PROCEDURE — 83735 ASSAY OF MAGNESIUM: CPT

## 2022-12-01 PROCEDURE — 85027 COMPLETE CBC AUTOMATED: CPT

## 2022-12-01 PROCEDURE — 2500000003 HC RX 250 WO HCPCS: Performed by: INTERNAL MEDICINE

## 2022-12-01 PROCEDURE — 1200000000 HC SEMI PRIVATE

## 2022-12-01 PROCEDURE — 94761 N-INVAS EAR/PLS OXIMETRY MLT: CPT

## 2022-12-01 PROCEDURE — 2700000000 HC OXYGEN THERAPY PER DAY

## 2022-12-01 RX ORDER — INSULIN LISPRO 100 [IU]/ML
0-4 INJECTION, SOLUTION INTRAVENOUS; SUBCUTANEOUS
Status: DISCONTINUED | OUTPATIENT
Start: 2022-12-01 | End: 2022-12-02 | Stop reason: HOSPADM

## 2022-12-01 RX ORDER — DEXTROSE MONOHYDRATE 100 MG/ML
INJECTION, SOLUTION INTRAVENOUS CONTINUOUS PRN
Status: DISCONTINUED | OUTPATIENT
Start: 2022-12-01 | End: 2022-12-02 | Stop reason: HOSPADM

## 2022-12-01 RX ORDER — INSULIN LISPRO 100 [IU]/ML
0-4 INJECTION, SOLUTION INTRAVENOUS; SUBCUTANEOUS NIGHTLY
Status: DISCONTINUED | OUTPATIENT
Start: 2022-12-01 | End: 2022-12-02 | Stop reason: HOSPADM

## 2022-12-01 RX ADMIN — FUROSEMIDE 40 MG: 10 INJECTION, SOLUTION INTRAMUSCULAR; INTRAVENOUS at 10:11

## 2022-12-01 RX ADMIN — Medication 10 ML: at 10:12

## 2022-12-01 RX ADMIN — DOXYCYCLINE 100 MG: 100 INJECTION, POWDER, LYOPHILIZED, FOR SOLUTION INTRAVENOUS at 03:55

## 2022-12-01 RX ADMIN — IPRATROPIUM BROMIDE AND ALBUTEROL SULFATE 1 AMPULE: .5; 3 SOLUTION RESPIRATORY (INHALATION) at 08:43

## 2022-12-01 RX ADMIN — INSULIN LISPRO 4 UNITS: 100 INJECTION, SOLUTION INTRAVENOUS; SUBCUTANEOUS at 21:09

## 2022-12-01 RX ADMIN — ENOXAPARIN SODIUM 40 MG: 100 INJECTION SUBCUTANEOUS at 10:11

## 2022-12-01 RX ADMIN — Medication 10 ML: at 20:51

## 2022-12-01 RX ADMIN — INSULIN LISPRO 4 UNITS: 100 INJECTION, SOLUTION INTRAVENOUS; SUBCUTANEOUS at 17:25

## 2022-12-01 RX ADMIN — PREDNISONE 40 MG: 20 TABLET ORAL at 10:11

## 2022-12-01 RX ADMIN — SODIUM CHLORIDE 25 ML: 9 INJECTION, SOLUTION INTRAVENOUS at 16:34

## 2022-12-01 RX ADMIN — FUROSEMIDE 40 MG: 10 INJECTION, SOLUTION INTRAMUSCULAR; INTRAVENOUS at 16:34

## 2022-12-01 RX ADMIN — DOXYCYCLINE 100 MG: 100 INJECTION, POWDER, LYOPHILIZED, FOR SOLUTION INTRAVENOUS at 16:53

## 2022-12-01 ASSESSMENT — PAIN SCALES - GENERAL
PAINLEVEL_OUTOF10: 0

## 2022-12-01 NOTE — DISCHARGE INSTR - DIET
Good nutrition is important when healing from an illness, injury, or surgery. Follow any nutrition recommendations given to you during your hospital stay. If you were given an oral nutrition supplement while in the hospital, continue to take this supplement at home. You can take it with meals, in-between meals, and/or before bedtime. These supplements can be purchased at most local grocery stores, pharmacies, and chain super-stores. If you have any questions about your diet or nutrition, call the hospital and ask for the dietitian. For nutrition questions after discharge please call the Registered Dietitian at [phone]. Heart Failure Nutrition Therapy  This diet will help you feel better and support your heart by reducing symptoms of fluid retention, shortness of breath and swelling. You should focus on:  Limiting sodium in your diet by reading labels and limiting foods high in sodium. Limit your daily sodium intake to 2,000 mg per day. Select foods with 140 mg of sodium or less per serving. Foods with more than 300 mg of sodium per serving may not fit into a reduced-sodium meal plan. Check serving sizes. If you eat more than 1 serving, you will get more sodium than the amount listed. Limiting fluid in your diet. Ask your doctor how much fluid you can have per day. Fluid restriction of 64 fluid ounces per day = 8 cups of 8 ounces each or about 2 liters per day. Remember foods that are liquid at room temperature such as popsicles, soup, ice cream and Jell-O are fluids. Checking your weight to make sure you're not retaining too much fluid. Weigh yourself every morning. If you gain 3 or more pounds in one day or 5 pounds within 1 week, call your doctor. Foods to choose and avoid:  Avoid processed foods. Eat more fresh foods. Fresh and frozen fruits and vegetables are good choices. Choose fresh meats. Avoid processed meats such as smith, sausage and hot dogs.   Do not add salt to your food while cooking or at the table. Try dry or fresh herbs, pepper, lemon juice, or a sodium-free seasoning blend such as Mrs. Dash to add flavor to food. Do not use a salt substitute. Use caution at [employer] foods are high in sodium. Ask for your food to be cooked without salt and request sauces and dressing to come on the side. 

## 2022-12-01 NOTE — PROGRESS NOTES
Hospitalist Progress Note      PCP: Narayan Alvarado MD    Date of Admission: 11/29/2022    Chief Complaint: Dyspnea      Subjective:   Dyspnea is improving. Still with some LE edema but better    Medications:  Reviewed    Infusion Medications    dextrose      sodium chloride       Scheduled Medications    insulin lispro  0-4 Units SubCUTAneous TID WC    insulin lispro  0-4 Units SubCUTAneous Nightly    sodium chloride flush  10 mL IntraVENous 2 times per day    enoxaparin  40 mg SubCUTAneous Daily    furosemide  40 mg IntraVENous BID    doxycycline (VIBRAMYCIN) IV  100 mg IntraVENous Q12H    predniSONE  40 mg Oral Daily    ipratropium-albuterol  1 ampule Inhalation BID     PRN Meds: dextrose bolus **OR** dextrose bolus, glucagon (rDNA), dextrose, sodium chloride flush, sodium chloride, potassium chloride, magnesium sulfate, promethazine **OR** ondansetron, acetaminophen **OR** acetaminophen, albuterol      Intake/Output Summary (Last 24 hours) at 12/1/2022 1450  Last data filed at 12/1/2022 2212  Gross per 24 hour   Intake 304.14 ml   Output 385 ml   Net -80.86 ml       Exam:    /84   Pulse 81   Temp 98.2 °F (36.8 °C) (Oral)   Resp 16   Ht 5' (1.524 m)   Wt 173 lb 14.4 oz (78.9 kg)   SpO2 95%   BMI 33.96 kg/m²     Gen/overall appearance: Not in acute distress. Alert. Head: Normocephalic, atraumatic  Eyes: EOMI, no scleral icterus  CVS: regular rate and rhythm, Normal S1S2  Pulm: Diminished, no crackles, no wheezes  Gastrointestinal: Soft, nontender, nondistended, no guarding or rebound  Extremities: bilat LE edema.  No erythema or warmth  Neuro: No gross focal deficits noted  Skin: Warm, dry    Labs:   Recent Labs     11/29/22  2333 12/01/22  0440   WBC 8.1 10.6   HGB 12.6 13.0   HCT 38.2 38.5    329     Recent Labs     11/29/22 2333 12/01/22  0440   * 135*   K 4.6 4.3   CL 92* 89*   CO2 41* 40*   BUN 14 16   CREATININE 0.6 <0.5*   CALCIUM 9.8 9.4     Recent Labs 11/29/22  2333   AST 16   ALT 16   BILITOT 0.3   ALKPHOS 120     No results for input(s): INR in the last 72 hours. Recent Labs     11/29/22 2333   TROPONINI <0.01       Assessment/Plan:    Active Hospital Problems    Diagnosis Date Noted    Acute respiratory failure with hypoxia ([facility] [address] [J96.01] 11/30/2022     Priority: Medium        Acute decompensated diastolic CHFexacerbation  Strict I's and O's, Daily weights  Lasix 40 IV twice daily  Continued home medications  Heart failure protocol set ordered  Continue to check response   ECHO with with grade II diastolic dysfunction and preserved EF    Acute obstructive disease exacerbation  Suspected emphysema  Doxycycline, DuoNebs, prednisone     Recent Influenza positive: Reported tested outpatient  Supportive care    Acute on chronic hypoxic respiratory failure 2/2 above  Patient has known lung disease but does not know exactly diagnosis  Wean oxygen as tolerated back to baseline at 3 L       DVT Prophylaxis: lovenox  Diet: ADULT DIET; Regular; 3 carb choices (45 gm/meal); Low Fat/Low Chol/High Fiber/2 gm Na;  Low Sodium (2 gm)  Code Status: Full Code      Olive Rosado MD

## 2022-12-01 NOTE — PLAN OF CARE
Problem: Hematologic - Adult  Goal: Maintains hematologic stability  Outcome: Progressing     Vitals:    11/30/22 2331   BP: (!) 165/71   Pulse: 86   Resp: 14   Temp: 98.8 °F (37.1 °C)   SpO2: 99%     VSS overnight with /71. Disoriented/forgetful & impulsive at times & needs assistance often with toileting/repositioning/reality re-orientation. See all flowsheets. Will continue to monitor.

## 2022-12-01 NOTE — PLAN OF CARE
Problem: Hematologic - Adult  Goal: Maintains hematologic stability  Outcome: Progressing     Problem: Pain  Goal: Verbalizes/displays adequate comfort level or baseline comfort level  Outcome: Progressing     Vitals:    12/01/22 0356   BP: (!) 149/87   Pulse: 81   Resp: 16   Temp: 97.8 °F (36.6 °C)   SpO2: 93%     Pt was asleep, awake with RN at bedside. VSS with /87; see all flowsheets. Pt denies pain. Will continue to monitor.

## 2022-12-01 NOTE — PROGRESS NOTES
Incentive Spirometry education and demonstration completed by Respiratory Therapy Yes      Response to education: Excellent     Teaching Time: 5 minutes    Minimum Predicted Vital Capacity - 500 mL. Patient's Actual Vital Capacity - 500 mL. Turning over to Nursing for routine follow-up Yes. Comments: pt understands the use and purpose of the IS did very well.     Electronically signed by Eloise Sanchez RCP on 12/1/2022 at 12:16 AM

## 2022-12-02 VITALS
WEIGHT: 173.5 LBS | SYSTOLIC BLOOD PRESSURE: 146 MMHG | TEMPERATURE: 97.9 F | HEIGHT: 60 IN | BODY MASS INDEX: 34.06 KG/M2 | DIASTOLIC BLOOD PRESSURE: 66 MMHG | RESPIRATION RATE: 18 BRPM | OXYGEN SATURATION: 94 % | HEART RATE: 88 BPM

## 2022-12-02 LAB
ANION GAP SERPL CALCULATED.3IONS-SCNC: 6 MMOL/L (ref 3–16)
BUN BLDV-MCNC: 22 MG/DL (ref 7–20)
CALCIUM SERPL-MCNC: 10 MG/DL (ref 8.3–10.6)
CHLORIDE BLD-SCNC: 89 MMOL/L (ref 99–110)
CO2: 43 MMOL/L (ref 21–32)
CREAT SERPL-MCNC: 0.6 MG/DL (ref 0.6–1.2)
ESTIMATED AVERAGE GLUCOSE: 185.8 MG/DL
GFR SERPL CREATININE-BSD FRML MDRD: >60 ML/MIN/{1.73_M2}
GLUCOSE BLD-MCNC: 178 MG/DL (ref 70–99)
GLUCOSE BLD-MCNC: 183 MG/DL (ref 70–99)
GLUCOSE BLD-MCNC: 255 MG/DL (ref 70–99)
HBA1C MFR BLD: 8.1 %
HCT VFR BLD CALC: 43.4 % (ref 36–48)
HEMOGLOBIN: 14.4 G/DL (ref 12–16)
MAGNESIUM: 2 MG/DL (ref 1.8–2.4)
MCH RBC QN AUTO: 30.2 PG (ref 26–34)
MCHC RBC AUTO-ENTMCNC: 33.2 G/DL (ref 31–36)
MCV RBC AUTO: 90.9 FL (ref 80–100)
PDW BLD-RTO: 13.6 % (ref 12.4–15.4)
PERFORMED ON: ABNORMAL
PERFORMED ON: ABNORMAL
PLATELET # BLD: 344 K/UL (ref 135–450)
PMV BLD AUTO: 7.7 FL (ref 5–10.5)
POTASSIUM SERPL-SCNC: 4.7 MMOL/L (ref 3.5–5.1)
RBC # BLD: 4.77 M/UL (ref 4–5.2)
SODIUM BLD-SCNC: 138 MMOL/L (ref 136–145)
WBC # BLD: 10.8 K/UL (ref 4–11)

## 2022-12-02 PROCEDURE — 83735 ASSAY OF MAGNESIUM: CPT

## 2022-12-02 PROCEDURE — 94640 AIRWAY INHALATION TREATMENT: CPT

## 2022-12-02 PROCEDURE — 2500000003 HC RX 250 WO HCPCS: Performed by: INTERNAL MEDICINE

## 2022-12-02 PROCEDURE — 6370000000 HC RX 637 (ALT 250 FOR IP): Performed by: INTERNAL MEDICINE

## 2022-12-02 PROCEDURE — 94761 N-INVAS EAR/PLS OXIMETRY MLT: CPT

## 2022-12-02 PROCEDURE — 80048 BASIC METABOLIC PNL TOTAL CA: CPT

## 2022-12-02 PROCEDURE — 85027 COMPLETE CBC AUTOMATED: CPT

## 2022-12-02 PROCEDURE — 2700000000 HC OXYGEN THERAPY PER DAY

## 2022-12-02 PROCEDURE — 2580000003 HC RX 258: Performed by: INTERNAL MEDICINE

## 2022-12-02 PROCEDURE — 36415 COLL VENOUS BLD VENIPUNCTURE: CPT

## 2022-12-02 PROCEDURE — 6360000002 HC RX W HCPCS: Performed by: INTERNAL MEDICINE

## 2022-12-02 RX ORDER — FUROSEMIDE 20 MG/1
20 TABLET ORAL DAILY
Qty: 30 TABLET | Refills: 2 | Status: SHIPPED | OUTPATIENT
Start: 2022-12-02

## 2022-12-02 RX ORDER — DOXYCYCLINE HYCLATE 100 MG
100 TABLET ORAL 2 TIMES DAILY
Qty: 6 TABLET | Refills: 0
Start: 2022-12-02 | End: 2022-12-05

## 2022-12-02 RX ADMIN — FUROSEMIDE 40 MG: 10 INJECTION, SOLUTION INTRAMUSCULAR; INTRAVENOUS at 09:24

## 2022-12-02 RX ADMIN — Medication 10 ML: at 09:24

## 2022-12-02 RX ADMIN — SODIUM CHLORIDE, PRESERVATIVE FREE 10 ML: 5 INJECTION INTRAVENOUS at 03:23

## 2022-12-02 RX ADMIN — DOXYCYCLINE 100 MG: 100 INJECTION, POWDER, LYOPHILIZED, FOR SOLUTION INTRAVENOUS at 03:25

## 2022-12-02 RX ADMIN — SODIUM CHLORIDE 25 ML: 9 INJECTION, SOLUTION INTRAVENOUS at 03:24

## 2022-12-02 RX ADMIN — ENOXAPARIN SODIUM 40 MG: 100 INJECTION SUBCUTANEOUS at 09:24

## 2022-12-02 RX ADMIN — IPRATROPIUM BROMIDE AND ALBUTEROL SULFATE 1 AMPULE: .5; 3 SOLUTION RESPIRATORY (INHALATION) at 09:58

## 2022-12-02 RX ADMIN — INSULIN LISPRO 2 UNITS: 100 INJECTION, SOLUTION INTRAVENOUS; SUBCUTANEOUS at 12:10

## 2022-12-02 RX ADMIN — PREDNISONE 40 MG: 20 TABLET ORAL at 09:23

## 2022-12-02 ASSESSMENT — PAIN SCALES - GENERAL
PAINLEVEL_OUTOF10: 0
PAINLEVEL_OUTOF10: 0

## 2022-12-02 NOTE — PROGRESS NOTES
Physician Progress Note      Ethan Munroe  [facility] #:                  [id number]  :                       1934  ADMIT DATE:       2022 10:10 PM  DISCH DATE:        2022 3:00 PM  RESPONDING  PROVIDER #:        Segundo Moody MD          QUERY TEXT:    Patient admitted with heart failure. Noted documentation of acute respiratory   failure in  H&P. In order to support the diagnosis of acute respiratory   failure, please include additional clinical indicators in your documentation. Or please document if the diagnosis of acute respiratory failure has been   ruled out after further study. The medical record reflects the following:  Risk Factors: hx of chronic hypoxic respiratory failure  Clinical Indicators: Per ED note- \"Respiratory:  Positive for cough, chest   tightness and shortness of breath. Pulmonary:  Effort: Pulmonary effort is normal. No respiratory distress. \" Patient does   wear 3 L of oxygen at baseline, requiring 5 L in the emergency department to   keep oxygen saturation greater than 90%. Patient did ambulate to the bathroom and saturation dropped on the 5 L into   the mid 80s. \"  Per H&P- \"Respiratory: + Cough, + sputum, + dyspnea. Respiratory: minimal   accessory muscle usage, Normal respiratory effort. \" 3 lpm O2 via NC baseline.   Treatment: Labs, supplemental O2    Acute Respiratory Failure Clinical Indicators per [employer] Training Guide and   Quick Reference Guide:  pO2 < 60 mmHg or SpO2 (pulse oximetry) < 91% breathing room air  pCO2 > 50 and pH < 7.35  P/F ratio (pO2 / FIO2) < 300  pO2 decrease or pCO2 increase by 10 mmHg from baseline (if known)  Supplemental oxygen of 40% or more  Presence of respiratory distress, tachypnea, dyspnea, shortness of breath,   wheezing  Unable to speak in complete sentences  Use of accessory muscles to breathe  Extreme anxiety and feeling of impending doom  Tripod position  Confusion/altered mental status/obtunded  Options provided:  -- Acute Respiratory Failure as evidenced by, Please document evidence.   -- Acute Respiratory Failure ruled out after study  -- Acute Respiratory Failure ruled out after study and Chronic Respiratory   Failure confirmed  -- Other - I will add my own diagnosis  -- Disagree - Not applicable / Not valid  -- Disagree - Clinically unable to determine / Unknown  -- Refer to Clinical Documentation Reviewer    PROVIDER RESPONSE TEXT:    As noted in note, she would have acute on chronic hypoxic respiratory failure    Query created by: Traci Angel on 12/1/2022 2:12 PM      Electronically signed by:  Ava Ulrich MD 12/2/2022 3:30 PM

## 2022-12-02 NOTE — DISCHARGE SUMMARY
Hospital Medicine Discharge Summary    Patient ID: Jak Diaz      Patient's PCP: Roberta Adame MD    Admit Date: 11/29/2022     Discharge Date: 12/2/2022    Admitting Physician: Nathaly Gross DO     Discharge Physician: Christina Herring MD     Discharge Diagnoses and Hospital Course: Active Hospital Problems    Diagnosis Date Noted    Acute respiratory failure with hypoxia ([address] [J96.01] 11/30/2022     Priority: Medium       Acute decompensated diastolic CHF exacerbation. Clinically improved  Strict I's and O's, Daily weights  Diuresis with IV lasix   Continued home medications  Heart failure protocol set ordered  Continue to check response   ECHO with with grade II diastolic dysfunction and preserved EF  Transitioned to lasix 20 po qday on d/c     Acute obstructive disease exacerbation  Suspected emphysema  DuoNebs,   S/p prednisone; now d/michelle  [employer] doxy course     Recent Influenza positive: Reported tested outpatient  Supportive care     Acute on chronic hypoxic respiratory failure 2/2 above  Patient has known lung disease but does not know exactly diagnosis  Weaned oxygen back to baseline at 3 L         Exam:     The patient was seen and examined on day of discharge and this discharge summary is in conjunction with any daily progress note from day of discharge. Consults:     IP CONSULT TO HEART FAILURE NURSE/COORDINATOR  IP CONSULT TO DIETITIAN    Disposition:  home     Condition:  Stable    Discharge Instructions/Follow-up:   Pt to follow up with PCP within 1 week  Consultants as scheduled    Code Status:  Full Code    Activity: activity as tolerated    Diet: Resume home diet    Labs:  For convenience and continuity at follow-up the following most recent labs are provided:      CBC:    Lab Results   Component Value Date/Time    WBC 10.8 12/02/2022 05:03 AM    HGB 14.4 12/02/2022 05:03 AM    HCT 43.4 12/02/2022 05:03 AM     12/02/2022 05:03 AM       Renal:    Lab Results   Component Value Date/Time     12/02/2022 05:03 AM    K 4.7 12/02/2022 05:03 AM    CL 89 12/02/2022 05:03 AM    CO2 43 12/02/2022 05:03 AM    BUN 22 12/02/2022 05:03 AM    CREATININE 0.6 12/02/2022 05:03 AM    CALCIUM 10.0 12/02/2022 05:03 AM       Discharge Medications:     Current Discharge Medication List             Details   doxycycline hyclate (VIBRA-TABS) 100 MG tablet Take 1 tablet by mouth 2 times daily for 3 days  Qty: 6 tablet, Refills: 0      furosemide (LASIX) 20 MG tablet Take 1 tablet by mouth daily  Qty: 30 tablet, Refills: 2                Details   aspirin 81 MG EC tablet Take 81 mg by mouth daily      MAGNESIUM PO Take 1 tablet by mouth daily      Cholecalciferol (VITAMIN D) 10 MCG (400 UNIT) CAPS Capsule Take 400 Units by mouth daily      vitamin E 400 UNIT capsule Take 400 Units by mouth daily      Omega-3 Fatty Acids (FISH OIL) 1000 MG capsule Take 1,000 mg by mouth daily      metFORMIN (GLUCOPHAGE) 1000 MG tablet Take 1,000 mg by mouth 2 times daily (with meals)      metoprolol succinate (TOPROL XL) 100 MG extended release tablet Take 100 mg by mouth daily      benazepril (LOTENSIN) 20 MG tablet Take 20 mg by mouth in the morning and at bedtime      amLODIPine (NORVASC) 10 MG tablet Take 10 mg by mouth daily      isosorbide mononitrate (IMDUR) 120 MG extended release tablet Take 120 mg by mouth daily      lovastatin (MEVACOR) 20 MG tablet Take 20 mg by mouth nightly      albuterol sulfate HFA (PROVENTIL;VENTOLIN;PROAIR) 108 (90 Base) MCG/ACT inhaler Inhale 2 puffs into the lungs every 6 hours as needed for Wheezing             Time Spent on discharge is more than 45 minutes in the examination, evaluation, counseling and review of medications and discharge plan. Signed:    Heaven Castillo MD   12/2/2022    Thank you Bradley Quezada MD for the opportunity to be involved in this patient's care.

## 2022-12-02 NOTE — PLAN OF CARE
Problem: Pain  Goal: Verbalizes/displays adequate comfort level or baseline comfort level  Outcome: Progressing     Problem: Hematologic - Adult  Goal: Maintains hematologic stability  Outcome: Progressing     Vitals:    12/02/22 0327   BP: (!) 172/71   Pulse: 73   Resp: 16   Temp: 98.3 °F (36.8 °C)   SpO2: 92%     Pt awake/asleep on/off, mostly awake overnight. Pt denies pain. VS obtained. PIV SL flushed with NS, and IV vibramycin started. See [facility] & all flowsheets. Will continue to monitor.

## 2022-12-02 NOTE — CONSULTS
[facility] 1934    History:  History reviewed. No pertinent past medical history. ECHO:  12/1/22   Summary   Left ventricular cavity size is normal with mild concentric left ventricular   hypertrophy. Overall, left ventricular systolic function appears normal. Ejection   fraction is visually estimated to be 55-60%. No regional wall motion abnormalities are noted. Grade II diastolic dysfunction with elevated LV filling pressures. Mild mitral annular calcification. Mitral valve leaflets appear mildly   thickened. Trivial mitral regurgitation is present. Mild tricuspid regurgitation with an RVSP of 50 mmHg. Mild to moderate   pulmonary hypertension. ACE/ARB/ARNi: benazepril 20 mg bid  BB: toprol xl 100 mg daily  Aldosterone Antagonist: Patient not on- consider if not contraindicated   SGLT2: Patient not on- consider if not contraindicated       History of sleep apnea: NoEpworth Screen ordered: Yes    DM History: Yes  Consult for DM educator: No      Last Hospital Admission: no previous admissions noted in EMR  Code Status: full code  Discharge plans:    Family Present: yes, daughter    Karlene Guerrero was admitted to the hospital with increased shortness of breath. She is visiting her daughter from [address] and had become sick with the flu. Patient became increasing dyspneic and had increasing edema. Patient follows with Dr. Salena Meckel at [facility] at home normally as an outpatient. Patient does not weigh daily. Felt it had been stable. She is down 6 lb from admission. Discussed establishing new baseline. Patient states she is compliant with sodium and fluid restrictions. She is compliant with medications. Her diuretics were stopped in August per patient request for urinary frequency. She states she had felt well, weight stable until falling ill. Patient is compliant with follow ups. She has family support. Daughter at bedside is an RN.      Patient provided with both written and verbal education on CHF signs/ symptoms, causes, discharge medications, daily weights, low sodium diet, activity, and follow-up. Pt to call if gains 3 pounds in one day or 5 pounds in one week. Mutually agreed upon goals were discussed such as calling the MD as soon as they recognize symptoms and weight gain, maintaining proper diet, taking medications as prescribed, joining cardiac rehab when able. Also reviewed importance of risk factor reduction. Patient provided with CHF Zone Management tool and CHF symptoms magnet. Discussed importance of lifestyle changes: encouraged daily weights    PATIENT/CAREGIVER TEACHING:    Level of patient/caregiver understanding able to:   [ x] Verbalize understanding [ ] Demonstrate understanding [ ] Teach back   [ ] Needs reinforcement [ ] Other:       Time spent teachin mins    1. WEIGHT: Admit Weight: 176 lb (79.8 kg)      Today  Weight: 173 lb 8 oz (78.7 kg)   2. I/O   Intake/Output Summary (Last 24 hours) at 2022 1236  Last data filed at 2022 0533  Gross per 24 hour   Intake 512.59 ml   Output 2600 ml   Net -2087.41 ml       Recommendations:   1. She will need a one week hospital follow up at discharge  2. Educate further on fluid restriction 48 oz- 64 oz during inpatient stay so she can understand how to measure intake at home. 3. Continue to educate on S/S.   4. Emphasize daily weights, diet, and knowing when and who to call  5. Provided patient with CHF Resource Line for questions and concerns.          POLY VELIZ RN 2022 12:36 PM

## 2022-12-02 NOTE — PLAN OF CARE
Patient and daughter educated on new medication, CHF (daily wts, no added salt diet), MD follow up appointments. Patient and daughter verbalized understanding.

## 2022-12-02 NOTE — DISCHARGE INSTRUCTIONS
Your information:  Name: Rajesh Marion  : 1934    Your Discharge Instructions    What to do after you leave the hospital:    Read, review and familiarize yourself with the information provided below and in a separate packet on  COVID-19 and Flu Symptoms, Heart Failure and Heart Failure Zones    Diet: Carb Control (45 grams/meal), Low Fat, Low Cholesterol, High Fiber, Low Sodium (2 grams/day)    Recommended activity:  Resume as tolerated  Avoid strenuous activity until instructed by your physician to resume; balance rest with periods of light to normal activity. If you experience any of the following: Unusual or inadequately controlled pain; unusual transient shortness of breath; recurrent or persistent nausea, heartburn, palpitations or lightheadedness; increased swelling; increased fatigue; fever >100; please follow up with George Carvalho MD or go to the Emergency Room.       Home Health/ Outpatient Services: N/A      Information obtained by:  By signing below, I understand and acknowledge receipt of the instructions indicated above, and I understand that if any problems occur once I leave the hospital I am to contact George Carvalho MD.

## 2022-12-02 NOTE — PROGRESS NOTES
Physical/Occupational Therapy  [facility]  Orders received, chart reviewed. Attempted PT/OT evaluation this date. Pt seated on BSC upon arrival with daughter present. Daughter reporting pt has no mobility or ADL needs at this time and is taking pt home this date. Will d/c pt from PT/OT caseloads.    [id number] [facility] PT,  [facility], [facility] OTR/L [id number]

## 2022-12-04 LAB
BLOOD CULTURE, ROUTINE: NORMAL
CULTURE, BLOOD 2: NORMAL

## 2022-12-05 ENCOUNTER — TELEPHONE (OUTPATIENT)
Dept: OTHER | Age: 87
End: 2022-12-05

## 2022-12-05 NOTE — TELEPHONE ENCOUNTER
[address] [facility] FAILURE PROGRAM  TELEPHONE ENCOUNTER FORM    Martin Mccray 1934    ASSESSMENT:   Baseline weight: 170 lbs  Current weight: 170 lbs  Patient weighing daily: Yes  What are your symptoms of heart failure: chest pain, dyspnea, edema  Are you having any symptoms:  No  Patient knows who to call with symptoms: Yes  Diet history:      Patient states sodium limitation is : 3000 mg      Patient states fluid limitation is 64 oz  Patient following diet as instructed: Yes  Medication history: taking medications as instructed Yes; medication side effects noted No  Patient is being active at home: Yes  Patient knows date and time of follow up appointment: Yes   Patient has transportation to appointments: Yes    RECOMMENDATIONS:   Medication: taking as prescribed  Diet: no added salt diet  MD/ Clinic appointment: Sees cardiology on 12/8  Other: Doing well. Encouraged patient to call with any questions or concerns.